# Patient Record
Sex: MALE | Race: WHITE | Employment: OTHER | URBAN - METROPOLITAN AREA
[De-identification: names, ages, dates, MRNs, and addresses within clinical notes are randomized per-mention and may not be internally consistent; named-entity substitution may affect disease eponyms.]

---

## 2017-06-12 ENCOUNTER — HOSPITAL ENCOUNTER (OUTPATIENT)
Dept: OTHER | Age: 67
Discharge: HOME | End: 2017-06-12
Attending: FAMILY MEDICINE | Admitting: FAMILY MEDICINE

## 2017-06-12 LAB
A/G RATIO: 1.5 (ref 1.1–2.2)
ALBUMIN SERPL-MCNC: 4.1 G/DL (ref 3.4–5)
ALP BLD-CCNC: 94 U/L (ref 40–129)
ALT SERPL-CCNC: 24 U/L (ref 10–40)
ANION GAP SERPL CALCULATED.3IONS-SCNC: 15 MMOL/L (ref 3–16)
AST SERPL-CCNC: 20 U/L (ref 15–37)
BILIRUB SERPL-MCNC: 0.4 MG/DL (ref 0–1)
BUN BLDV-MCNC: 11 MG/DL (ref 7–20)
CALCIUM SERPL-MCNC: 9.1 MG/DL (ref 8.3–10.6)
CHLORIDE BLD-SCNC: 101 MMOL/L (ref 99–110)
CHOLESTEROL, TOTAL: 128 MG/DL (ref 0–199)
CO2: 24 MMOL/L (ref 21–32)
CREAT SERPL-MCNC: 0.8 MG/DL (ref 0.8–1.3)
GFR AFRICAN AMERICAN: >60
GFR NON-AFRICAN AMERICAN: >60
GLOBULIN: 2.8 G/DL
GLUCOSE BLD-MCNC: 111 MG/DL (ref 70–99)
HCT VFR BLD CALC: 41.9 % (ref 40.5–52.5)
HDLC SERPL-MCNC: 49 MG/DL (ref 40–60)
HEMOGLOBIN: 14 G/DL (ref 13.5–17.5)
LDL CHOLESTEROL CALCULATED: 40 MG/DL
MCH RBC QN AUTO: 33 PG (ref 26–34)
MCHC RBC AUTO-ENTMCNC: 33.4 G/DL (ref 31–36)
MCV RBC AUTO: 98.8 FL (ref 80–100)
PDW BLD-RTO: 13.9 % (ref 12.4–15.4)
PLATELET # BLD: 171 K/UL (ref 135–450)
PMV BLD AUTO: 10.7 FL (ref 5–10.5)
POTASSIUM SERPL-SCNC: 4.4 MMOL/L (ref 3.5–5.1)
RBC # BLD: 4.24 M/UL (ref 4.2–5.9)
SODIUM BLD-SCNC: 140 MMOL/L (ref 136–145)
TOTAL PROTEIN: 6.9 G/DL (ref 6.4–8.2)
TRIGL SERPL-MCNC: 196 MG/DL (ref 0–150)
VLDLC SERPL CALC-MCNC: 39 MG/DL
WBC # BLD: 7.1 K/UL (ref 4–11)

## 2017-06-14 LAB
EER HEPATITIS C RNA PCR QUANT W/ GENOTYPE RFLX: NORMAL
HCV RNA QNT REAL-TIME PCR INTERP: NOT DETECTED
HCV RNA, QUANTITATIVE REAL TIME PCR: <1.2 LOG IU
HEPATITIS C RNA PCR QUANT: <15 IU/ML

## 2017-07-07 ENCOUNTER — HOSPITAL ENCOUNTER (OUTPATIENT)
Dept: SURGERY | Age: 67
Discharge: HOME | End: 2017-07-07
Attending: INTERNAL MEDICINE | Admitting: INTERNAL MEDICINE

## 2017-07-07 VITALS
HEART RATE: 76 BPM | HEIGHT: 64 IN | DIASTOLIC BLOOD PRESSURE: 65 MMHG | BODY MASS INDEX: 25.61 KG/M2 | SYSTOLIC BLOOD PRESSURE: 117 MMHG | WEIGHT: 150 LBS | OXYGEN SATURATION: 97 % | RESPIRATION RATE: 16 BRPM | TEMPERATURE: 97.7 F

## 2017-07-07 RX ORDER — LIDOCAINE HYDROCHLORIDE 10 MG/ML
0.1 INJECTION, SOLUTION EPIDURAL; INFILTRATION; INTRACAUDAL; PERINEURAL
Status: ACTIVE | OUTPATIENT
Start: 2017-07-07 | End: 2017-07-07

## 2017-07-07 RX ORDER — SODIUM CHLORIDE, SODIUM LACTATE, POTASSIUM CHLORIDE, CALCIUM CHLORIDE 600; 310; 30; 20 MG/100ML; MG/100ML; MG/100ML; MG/100ML
INJECTION, SOLUTION INTRAVENOUS ONCE
Status: DISCONTINUED | OUTPATIENT
Start: 2017-07-07 | End: 2017-07-08 | Stop reason: HOSPADM

## 2017-07-07 ASSESSMENT — PAIN - FUNCTIONAL ASSESSMENT: PAIN_FUNCTIONAL_ASSESSMENT: 0-10

## 2017-07-07 ASSESSMENT — PAIN SCALES - GENERAL: PAINLEVEL_OUTOF10: 0

## 2017-12-05 ENCOUNTER — OFFICE VISIT (OUTPATIENT)
Dept: CARDIOLOGY CLINIC | Age: 67
End: 2017-12-05

## 2017-12-05 VITALS
OXYGEN SATURATION: 98 % | HEART RATE: 86 BPM | HEIGHT: 64 IN | BODY MASS INDEX: 26.46 KG/M2 | WEIGHT: 155 LBS | SYSTOLIC BLOOD PRESSURE: 126 MMHG | DIASTOLIC BLOOD PRESSURE: 70 MMHG

## 2017-12-05 DIAGNOSIS — I10 ESSENTIAL HYPERTENSION: Primary | ICD-10-CM

## 2017-12-05 DIAGNOSIS — I25.10 CORONARY ARTERY DISEASE INVOLVING NATIVE CORONARY ARTERY OF NATIVE HEART WITHOUT ANGINA PECTORIS: ICD-10-CM

## 2017-12-05 DIAGNOSIS — E78.2 MIXED HYPERLIPIDEMIA: ICD-10-CM

## 2017-12-05 PROCEDURE — 99214 OFFICE O/P EST MOD 30 MIN: CPT | Performed by: INTERNAL MEDICINE

## 2017-12-05 NOTE — LETTER
· Moves all extremities well  · Exhibits normal gait balance and coordination  · No abnormalities of mood, affect, memory, mentation, or behavior are noted    ECG January 2014- NSR (normal). No change in ECG from 2009. No components found for: CHLPL  Lab Results   Component Value Date    TRIG 196 (H) 06/12/2017    TRIG 145 12/13/2016    TRIG 281 (H) 05/19/2016     Lab Results   Component Value Date    HDL 49 06/12/2017    HDL 47 12/13/2016    HDL 51 05/19/2016     Lab Results   Component Value Date    LDLCALC 40 06/12/2017    LDLCALC 54 12/13/2016    LDLCALC 43 05/19/2016     Lab Results   Component Value Date    LABVLDL 39 06/12/2017    LABVLDL 29 12/13/2016    LABVLDL 56 05/19/2016       Assessment:     1. CAD (coronary artery disease)--March 2000 s/p Everett stent to prox L. CCx. Most recent exercise stress ECHO on 01/17/14 baseline echocardiogram shows normal LV function with an ejection fraction of 60%. Following exercise there was uniform augmentation of all segments with appropriate hyperdynamic response to exercise. There are no concerning symptoms for angina currently. Stable and will continue present medical regimen. 2. Hypertension -Blood pressure 126/70, pulse 86, height 5' 4\" (1.626 m), weight 155 lb (70.3 kg), SpO2 98 %. Well controlled and will continue current medical regimen. 3. Hyperlipidemia - Most recent lipids 6/12/17, see results above I personally reviewed. Labs are at goal except for mildly elevated TG. Advised better diet and losing weight. Note PCP increased crestor to 40mg qd in 2/16 and labs are good overall. Plan:  1. The patient is asked to make an attempt to improve diet and exercise patterns to aid in medical management of this problem. 2. Ok to switch to baby aspirin 81 mg daily    3. Continue to take all medications as prescribed  4. Follow up with me in 1 year. QUALITY MEASURES  1. Tobacco Cessation Counseling: Yes  2.  Retake of BP if >140/90:   NA

## 2017-12-05 NOTE — PROGRESS NOTES
[facility]   Cardiac Consultation    Referring Provider:  Desi Valenzuela MD     Chief Complaint   Patient presents with    1 Year Follow Up    Coronary Artery Disease    Hypertension    Hyperlipidemia    Discuss Labs     lipid and cmp 6/12/2017      Subjective: Mr Cyn Grimes presents for cardiology follow up of CAD, HTN, HLD; no complaints    History of Present Illness:    Toni Mcfarland is a 79 y.o. ;male who presents for routine annual cardiac follow up. Former patient of Dr. Maryjane Schultz but switched to me due to insurance reasons. He has a history of CAD s/p CCx stent March 2000, Hypertension, Hyperlipidemia, and DM (now off metformin). He underwent an angiogram in March 2000 by Dr. Kari Petersen which showed 50% prox LAD stenosis, 95% prox Lcx stenosis, 50% in mid RCA which is small co-dominant vessel. He underwent stent placement to Lcx. This was performed due to an abnormal exercise stress ECHO test with abnormal findings and inferior ST depression. He had complained of left lateral pain which felt like indigestion prior to the stress test.  He formally was seen by Dr. Kiki Aviles with last visit 8/2011. He smokes on average 6 small cigars per day. His father developed a virus in his heart, no other cardiac disease. Most recent exercise stress ECHO on 01/17/14 baseline echocardiogram shows normal LV function with an ejection fraction of 60%. Following exercise there was uniform augmentation of all segments with appropriate hyperdynamic response to exercise. Note PCP increased crestor to 40mg qd in 2/16. Today he reports feeling well overall. He continues to smoke 6-7 small cigars. He tolerates daily activities well. Denies chest pain, shortness of breath, edema, dizziness, palpitations and syncope. He retired 4 years ago from [employer]. He is scheduled for AAA ultrasound next week.      Past Medical History:   has a past medical history of CAD (coronary artery disease); Hypertension (tx since 2000); Diabetes mellitus (tx since 2000); and Hyperlipidemia. Surgical History:   has past surgical history that includes coronary angioplasty with stent (2000). Hemorrhoidectomy 1978. Social History:   reports that he has been smoking Cigars. He has never used smokeless tobacco. He reports that he drinks alcohol. He reports that he does not use drugs. Family History:  family history includes Cancer in his sister; Diabetes in his father and mother. Home Medications:  Prior to Admission medications    Medication Sig Start Date End Date Taking? Authorizing Provider   rosuvastatin (CRESTOR) 40 MG tablet Take 1 tablet by mouth every evening 11/8/16  Yes Katerine Rahman MD   lisinopril (PRINIVIL;ZESTRIL) 20 MG tablet Take 20 mg by mouth daily. Yes Historical Provider, MD   amLODIPine (NORVASC) 10 MG tablet Take 10 mg by mouth daily. Yes Historical Provider, MD   cetirizine (ZYRTEC) 10 MG tablet Take 10 mg by mouth daily. Yes Historical Provider, MD   aspirin 325 MG tablet Take 325 mg by mouth daily. Yes Historical Provider, MD   Multiple Vitamin (MULTIVITAMIN PO) Take  by mouth daily. Yes Historical Provider, MD        Allergies:  Review of patient's allergies indicates no known allergies. Review of Systems:   · Constitutional: there has been no unanticipated weight loss. There's been no change in energy level, sleep pattern, or activity level. · Eyes: No visual changes or diplopia. No scleral icterus. · ENT: No Headaches, hearing loss or vertigo. No mouth sores or sore throat. · Cardiovascular: Reviewed in HPI  · Respiratory: No cough or wheezing, no sputum production. No hematemesis. · Gastrointestinal: No abdominal pain, appetite loss, blood in stools. No change in bowel or bladder habits. · Genitourinary: No dysuria, trouble voiding, or hematuria. · Musculoskeletal:  No gait disturbance, weakness or joint complaints.   · Integumentary: No rash

## 2017-12-05 NOTE — COMMUNICATION BODY
Hypertension (tx since 2000); Diabetes mellitus (tx since 2000); and Hyperlipidemia. Surgical History:   has past surgical history that includes coronary angioplasty with stent (2000). Hemorrhoidectomy 1978. Social History:   reports that he has been smoking Cigars. He has never used smokeless tobacco. He reports that he drinks alcohol. He reports that he does not use drugs. Family History:  family history includes Cancer in his sister; Diabetes in his father and mother. Home Medications:  Prior to Admission medications    Medication Sig Start Date End Date Taking? Authorizing Provider   rosuvastatin (CRESTOR) 40 MG tablet Take 1 tablet by mouth every evening 11/8/16  Yes Alondra Walker MD   lisinopril (PRINIVIL;ZESTRIL) 20 MG tablet Take 20 mg by mouth daily. Yes Historical Provider, MD   amLODIPine (NORVASC) 10 MG tablet Take 10 mg by mouth daily. Yes Historical Provider, MD   cetirizine (ZYRTEC) 10 MG tablet Take 10 mg by mouth daily. Yes Historical Provider, MD   aspirin 325 MG tablet Take 325 mg by mouth daily. Yes Historical Provider, MD   Multiple Vitamin (MULTIVITAMIN PO) Take  by mouth daily. Yes Historical Provider, MD        Allergies:  Review of patient's allergies indicates no known allergies. Review of Systems:   · Constitutional: there has been no unanticipated weight loss. There's been no change in energy level, sleep pattern, or activity level. · Eyes: No visual changes or diplopia. No scleral icterus. · ENT: No Headaches, hearing loss or vertigo. No mouth sores or sore throat. · Cardiovascular: Reviewed in HPI  · Respiratory: No cough or wheezing, no sputum production. No hematemesis. · Gastrointestinal: No abdominal pain, appetite loss, blood in stools. No change in bowel or bladder habits. · Genitourinary: No dysuria, trouble voiding, or hematuria. · Musculoskeletal:  No gait disturbance, weakness or joint complaints.   · Integumentary: No rash

## 2017-12-07 ENCOUNTER — HOSPITAL ENCOUNTER (OUTPATIENT)
Dept: ULTRASOUND IMAGING | Age: 67
Discharge: HOME | End: 2017-12-07
Attending: FAMILY MEDICINE | Admitting: FAMILY MEDICINE

## 2017-12-07 DIAGNOSIS — I71.40 ABDOMINAL AORTIC ANEURYSM (AAA) WITHOUT RUPTURE: ICD-10-CM

## 2017-12-07 DIAGNOSIS — Z13.6 ENCOUNTER FOR SCREENING FOR CARDIOVASCULAR DISORDERS: ICD-10-CM

## 2017-12-07 LAB
A/G RATIO: 1.5 (ref 1.1–2.2)
ALBUMIN SERPL-MCNC: 4.1 G/DL (ref 3.4–5)
ALP BLD-CCNC: 100 U/L (ref 40–129)
ALT SERPL-CCNC: 32 U/L (ref 10–40)
ANION GAP SERPL CALCULATED.3IONS-SCNC: 10 MMOL/L (ref 3–16)
AST SERPL-CCNC: 25 U/L (ref 15–37)
BILIRUB SERPL-MCNC: 0.4 MG/DL (ref 0–1)
BUN BLDV-MCNC: 10 MG/DL (ref 7–20)
CALCIUM SERPL-MCNC: 9.1 MG/DL (ref 8.3–10.6)
CHLORIDE BLD-SCNC: 103 MMOL/L (ref 99–110)
CHOLESTEROL, TOTAL: 116 MG/DL (ref 0–199)
CO2: 27 MMOL/L (ref 21–32)
CREAT SERPL-MCNC: 0.8 MG/DL (ref 0.8–1.3)
GFR AFRICAN AMERICAN: >60
GFR NON-AFRICAN AMERICAN: >60
GLOBULIN: 2.7 G/DL
GLUCOSE BLD-MCNC: 117 MG/DL (ref 70–99)
HDLC SERPL-MCNC: 50 MG/DL (ref 40–60)
LDL CHOLESTEROL CALCULATED: 33 MG/DL
POTASSIUM SERPL-SCNC: 4.3 MMOL/L (ref 3.5–5.1)
SODIUM BLD-SCNC: 140 MMOL/L (ref 136–145)
TOTAL PROTEIN: 6.8 G/DL (ref 6.4–8.2)
TRIGL SERPL-MCNC: 164 MG/DL (ref 0–150)
VLDLC SERPL CALC-MCNC: 33 MG/DL

## 2019-10-22 ENCOUNTER — OFFICE VISIT (OUTPATIENT)
Dept: CARDIOLOGY CLINIC | Age: 69
End: 2019-10-22
Payer: MEDICARE

## 2019-10-22 VITALS
DIASTOLIC BLOOD PRESSURE: 68 MMHG | HEIGHT: 64 IN | HEART RATE: 77 BPM | BODY MASS INDEX: 26.63 KG/M2 | OXYGEN SATURATION: 98 % | SYSTOLIC BLOOD PRESSURE: 120 MMHG | WEIGHT: 156 LBS

## 2019-10-22 DIAGNOSIS — R94.31 ABNORMAL ELECTROCARDIOGRAM (ECG) (EKG): ICD-10-CM

## 2019-10-22 DIAGNOSIS — I10 HYPERTENSION, UNSPECIFIED TYPE: ICD-10-CM

## 2019-10-22 DIAGNOSIS — E78.49 OTHER HYPERLIPIDEMIA: Primary | ICD-10-CM

## 2019-10-22 DIAGNOSIS — I45.10 RIGHT BUNDLE BRANCH BLOCK: ICD-10-CM

## 2019-10-22 DIAGNOSIS — I25.10 CORONARY ARTERY DISEASE INVOLVING NATIVE CORONARY ARTERY OF NATIVE HEART WITHOUT ANGINA PECTORIS: ICD-10-CM

## 2019-10-22 PROCEDURE — 93000 ELECTROCARDIOGRAM COMPLETE: CPT | Performed by: INTERNAL MEDICINE

## 2019-10-22 PROCEDURE — 99214 OFFICE O/P EST MOD 30 MIN: CPT | Performed by: INTERNAL MEDICINE

## 2019-11-14 ENCOUNTER — HOSPITAL ENCOUNTER (OUTPATIENT)
Dept: CARDIOLOGY | Age: 69
Discharge: HOME | End: 2019-11-14
Payer: MEDICARE

## 2019-11-14 DIAGNOSIS — I45.10 RIGHT BUNDLE BRANCH BLOCK: ICD-10-CM

## 2019-11-14 DIAGNOSIS — R94.31 ABNORMAL ELECTROCARDIOGRAM (ECG) (EKG): ICD-10-CM

## 2019-11-14 LAB
LV EF: 48 %
LVEF MODALITY: NORMAL

## 2019-11-14 PROCEDURE — 3430000000 HC RX DIAGNOSTIC RADIOPHARMACEUTICAL: Performed by: INTERNAL MEDICINE

## 2019-11-14 PROCEDURE — 93017 CV STRESS TEST TRACING ONLY: CPT

## 2019-11-14 PROCEDURE — A9502 TC99M TETROFOSMIN: HCPCS | Performed by: INTERNAL MEDICINE

## 2019-11-14 PROCEDURE — 78452 HT MUSCLE IMAGE SPECT MULT: CPT

## 2019-11-14 RX ADMIN — TETROFOSMIN 31.5 MILLICURIE: 1.38 INJECTION, POWDER, LYOPHILIZED, FOR SOLUTION INTRAVENOUS at 10:08

## 2019-11-14 RX ADMIN — TETROFOSMIN 10.2 MILLICURIE: 1.38 INJECTION, POWDER, LYOPHILIZED, FOR SOLUTION INTRAVENOUS at 08:20

## 2019-11-18 DIAGNOSIS — I25.10 CORONARY ARTERY DISEASE INVOLVING NATIVE CORONARY ARTERY OF NATIVE HEART WITHOUT ANGINA PECTORIS: Primary | ICD-10-CM

## 2019-12-05 ENCOUNTER — HOSPITAL ENCOUNTER (OUTPATIENT)
Dept: CARDIOLOGY | Age: 69
Discharge: HOME | End: 2019-12-05
Payer: MEDICARE

## 2019-12-05 DIAGNOSIS — I25.10 CORONARY ARTERY DISEASE INVOLVING NATIVE CORONARY ARTERY OF NATIVE HEART WITHOUT ANGINA PECTORIS: ICD-10-CM

## 2019-12-05 LAB
LV EF: 55 %
LVEF MODALITY: NORMAL

## 2019-12-05 PROCEDURE — 93306 TTE W/DOPPLER COMPLETE: CPT

## 2020-11-09 NOTE — PROGRESS NOTES
[facility]   Cardiac Consultation    Referring Provider:  Raj Carrero MD     Chief Complaint   Patient presents with    1 Year Follow Up     no cardiac complaints       Subjective: Mr Racheal Hutton presents for cardiology follow up of CAD, HTN, HLD; no complaints today    History of Present Illness:    Won Carey is a 79 y.o. ;male who presents for routine annual cardiac follow up. Last OV October 2019. Former patient of Dr. Ary Alcocer switched due to insurance. He has PMH of CAD s/p CCx stent March 2000, HTN, HLD, and DM (now off metformin). He underwent an angiogram in March 2000 by Dr. Homero Tran which showed 50% prox LAD stenosis, 95% prox L. CCx stenosis, 50% in mid RCA which is small co-dominant vessel. He underwent stent placement to L. CCx. Note cath done due to abnormal exercise stress ECHO test with abnormal findings and inferior ST depression. He c/o left lateral pain which felt like indigestion prior to the stress test. Most recent exercise stress ECHO on 01/17/14 baseline ECHO showed normal LV function with an EF of 60%. Following exercise there was uniform augmentation of all segments with appropriate hyperdynamic response to exercise. Note PCP increased crestor to 40mg qd in 2/16. Note EKG 10/22/2019, shows sinus rhythm RBBB; LAFB (conduction findings new when compared to 2014). Most recent GXT myoview 11/14/19  Mild fixed defect within the inferior wall and infero-apex  consistent with attenuation artifact versus prior subendocardial infarction. No ischemia. Most recent ECHO 12/5/19 EF=55%. No regional wall abnls; Grade I DD with normal filling pressure. Aortic valve sclerotic, opens adequately. Trace TR and VA. Today he reports doing well. Denies chest pain, SOB, edema, dizziness, palpitations and syncope. 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 He now smokes 8 light cigarettes daily (formerly smoked little cigars).      Past Medical History:   has a past medical history of CAD (coronary artery disease); Hypertension (tx since 2000); Diabetes mellitus (tx since 2000); and Hyperlipidemia. Surgical History:   has past surgical history that includes coronary angioplasty with stent (2000). Hemorrhoidectomy 1978. Social History:   reports that he has been smoking cigars. He has never used smokeless tobacco. He reports current alcohol use. He reports that he does not use drugs. Family History:  family history includes Cancer in his sister; Diabetes in his father and mother. Home Medications:  Prior to Admission medications    Medication Sig Start Date End Date Taking? Authorizing Provider   aspirin 81 MG tablet Take 1 tablet by mouth daily 12/5/17  Yes Cameron Arambula MD   rosuvastatin (CRESTOR) 40 MG tablet Take 1 tablet by mouth every evening 11/8/16  Yes Cameron Arambula MD   lisinopril (PRINIVIL;ZESTRIL) 20 MG tablet Take 20 mg by mouth daily. Yes Historical Provider, MD   amLODIPine (NORVASC) 10 MG tablet Take 10 mg by mouth daily. Yes Historical Provider, MD   cetirizine (ZYRTEC) 10 MG tablet Take 10 mg by mouth daily. Yes Historical Provider, MD   Multiple Vitamin (MULTIVITAMIN PO) Take  by mouth daily. Yes Historical Provider, MD        Allergies:  Patient has no known allergies. Review of Systems:   · Constitutional: there has been no unanticipated weight loss. There's been no change in energy level, sleep pattern, or activity level. · Eyes: No visual changes or diplopia. No scleral icterus. · ENT: No Headaches, hearing loss or vertigo. No mouth sores or sore throat. · Cardiovascular: Reviewed in HPI  · Respiratory: No cough or wheezing, no sputum production. No hematemesis. · Gastrointestinal: No abdominal pain, appetite loss, blood in stools. No change in bowel or bladder habits. · Genitourinary: No dysuria, trouble voiding, or hematuria.   · Musculoskeletal:  No gait disturbance, weakness or joint complaints. · Integumentary: No rash or pruritis. · Neurological: No headache, diplopia, change in muscle strength, numbness or tingling. No change in gait, balance, coordination, mood, affect, memory, mentation, behavior. · Psychiatric: No anxiety, no depression. · Endocrine: No malaise, fatigue or temperature intolerance. No excessive thirst, fluid intake, or urination. No tremor. · Hematologic/Lymphatic: No abnormal bruising or bleeding, blood clots or swollen lymph nodes. · Allergic/Immunologic: No nasal congestion or hives. Physical Examination:    Vitals:    11/10/20 1401   BP: 130/74   Pulse: 75   SpO2: 97%        Constitutional and General Appearance: NAD   Respiratory:  · Normal excursion and expansion without use of accessory muscles  · Resp Auscultation: Normal breath sounds without dullness  Cardiovascular:  · The apical impulses not displaced  · Heart tones are crisp and normal  · Cervical veins are not engorged  · The carotid upstroke is normal in amplitude and contour without delay or bruit  ·  Regular rhythm w occasional ectopy Normal S1S2, No S3, No murmur  · Peripheral pulses are symmetrical and full  · There is no clubbing, cyanosis of the extremities.   · No edema  · Femoral Arteries: 2+ and equal  · Pedal Pulses: 2+ and equal   Abdomen:  · No masses or tenderness  · Liver/Spleen: No Abnormalities Noted  Neurological/Psychiatric:  · Alert and oriented in all spheres  · Moves all extremities well  · Exhibits normal gait balance and coordination  · No abnormalities of mood, affect, memory, mentation, or behavior are noted      No components found for: CHLPL  Lab Results   Component Value Date    TRIG 164 (H) 12/07/2017    TRIG 196 (H) 06/12/2017    TRIG 145 12/13/2016     Lab Results   Component Value Date    HDL 50 12/07/2017    HDL 49 06/12/2017    HDL 47 12/13/2016     Lab Results   Component Value Date    LDLCALC 33 12/07/2017    LDLCALC 40 06/12/2017    LDLCALC 54 12/13/2016     Lab Results   Component Value Date    LABVLDL 33 12/07/2017    LABVLDL 39 06/12/2017    LABVLDL 29 12/13/2016     LABS Care Everywhere 12/4/19 , HDL 53, LDL 35, , ALT 37, AST 27, H/H 14.4/42.6, PLAT 166, , K 4.2, BUN/CR 10/0.88, HgbA1C 6.5       Assessment:     1. CAD (coronary artery disease)--March 2000 s/p Everett stent to prox L. CCx. Most recent GXT myoview 11/14/19  Mild fixed defect within the inferior wall and infero-apex  consistent with attenuation artifact versus prior subendocardial infarction. No ischemia. Most recent ECHO 12/5/19 EF=55%. No regional wall abnls; Grade I DD with normal filling pressure. Aortic valve sclerotic, opens adequately. Trace TR and KS. There are no concerning symptoms for angina currently. 2. Hypertension -Blood pressure 130/74, pulse 75, height 5' 4\" (1.626 m), weight 153 lb (69.4 kg), SpO2 97 %. Well controlled and will continue current medical regimen. 3. Hyperlipidemia - Most recent lipids Care Everywhere 12/4/19 , HDL 53, LDL 35, , ALT 37, AST 27 , see results above I personally reviewed. Labs are at goal except for mildly elevated TG. Note PCP increased crestor to 40mg qd in 2/16 and labs are good overall. Need recheck since > 1 year. 4.   Abnormal EKG: Note EKG 10/22/2019, shows sinus rhythm RBBB; LAFB (conduction findings new when compared to 2014). Note new EKG finding. Plan:  1. Meds reviewed. Refills with PCP   2. Smoking cessation discussed but not ready to quit. 3. No changes today continue current meds   4. Labs with PCP   5. Follow up with me in 1 year   6. EKG today shows NSR 78bpm; PVC's/fusion complexes; RBBB; nonspecific ST change    This note was scribed in the presence of Carola Carranza MD by Silvio Mitchell RN    I, Dr. Angelica Gray, personally performed the services described in this documentation, as scribed by the above signed scribe in my presence.  It is both accurate and complete to my knowledge. I agree with the details independently gathered by the clinical support staff, while the remaining scribed note accurately describes my personal service to the patient. QUALITY MEASURES  1. Tobacco Cessation Counseling: Yes  2. Retake of BP if >140/90:   NA  3. Documentation to PCP/referring for new patient:  Sent to PCP at close of office visit  4. CAD patient on anti-platelet: Yes  5. CAD patient on STATIN therapy:  Yes  6. Patient with CHF and aFib on anticoagulation:  NA     Cost of prescription medications and patient compliance have been reviewed with patient. All questions answered. Thank you for allowing me to participate in the care of this individual.    Glenis Katz.  Karen Monroy M.D., [facility] - [place]

## 2020-11-10 ENCOUNTER — OFFICE VISIT (OUTPATIENT)
Dept: CARDIOLOGY CLINIC | Age: 70
End: 2020-11-10
Payer: MEDICARE

## 2020-11-10 VITALS
BODY MASS INDEX: 26.12 KG/M2 | WEIGHT: 153 LBS | HEART RATE: 75 BPM | DIASTOLIC BLOOD PRESSURE: 74 MMHG | OXYGEN SATURATION: 97 % | SYSTOLIC BLOOD PRESSURE: 130 MMHG | HEIGHT: 64 IN

## 2020-11-10 PROBLEM — Z87.891 HISTORY OF TOBACCO ABUSE: Status: ACTIVE | Noted: 2020-11-10

## 2020-11-10 PROCEDURE — 99214 OFFICE O/P EST MOD 30 MIN: CPT | Performed by: INTERNAL MEDICINE

## 2020-11-10 PROCEDURE — 93000 ELECTROCARDIOGRAM COMPLETE: CPT | Performed by: INTERNAL MEDICINE

## 2020-11-10 RX ORDER — ROSUVASTATIN CALCIUM 40 MG/1
40 TABLET, COATED ORAL EVERY EVENING
Qty: 90 TABLET | Refills: 3 | Status: CANCELLED | OUTPATIENT
Start: 2020-11-10

## 2020-11-10 RX ORDER — AMLODIPINE BESYLATE 10 MG/1
10 TABLET ORAL DAILY
Qty: 90 TABLET | Refills: 3 | Status: CANCELLED | OUTPATIENT
Start: 2020-11-10

## 2020-11-10 RX ORDER — LISINOPRIL 20 MG/1
20 TABLET ORAL DAILY
Qty: 90 TABLET | Refills: 3 | Status: CANCELLED | OUTPATIENT
Start: 2020-11-10

## 2020-11-10 NOTE — LETTER
[facility]   Cardiac Consultation    Referring Provider:  Camila Krabbe, MD     Chief Complaint   Patient presents with    1 Year Follow Up     no cardiac complaints       Subjective: Mr Lor Durham presents for cardiology follow up of CAD, HTN, HLD; no complaints today    History of Present Illness:    Patrick Corey is a 79 y.o. ;male who presents for routine annual cardiac follow up. Last OV October 2019. Former patient of Dr. Tyler Jackson switched due to insurance. He has PMH of CAD s/p CCx stent March 2000, HTN, HLD, and DM (now off metformin). He underwent an angiogram in March 2000 by Dr. Elinor Evans which showed 50% prox LAD stenosis, 95% prox L. CCx stenosis, 50% in mid RCA which is small co-dominant vessel. He underwent stent placement to L. CCx. Note cath done due to abnormal exercise stress ECHO test with abnormal findings and inferior ST depression. He c/o left lateral pain which felt like indigestion prior to the stress test. Most recent exercise stress ECHO on 01/17/14 baseline ECHO showed normal LV function with an EF of 60%. Following exercise there was uniform augmentation of all segments with appropriate hyperdynamic response to exercise. Note PCP increased crestor to 40mg qd in 2/16. Note EKG 10/22/2019, shows sinus rhythm RBBB; LAFB (conduction findings new when compared to 2014). Most recent GXT myoview 11/14/19  Mild fixed defect within the inferior wall and infero-apex  consistent with attenuation artifact versus prior subendocardial infarction. No ischemia. Most recent ECHO 12/5/19 EF=55%. No regional wall abnls; Grade I DD with normal filling pressure. Aortic valve sclerotic, opens adequately. Trace TR and AR. Today he reports doing well. Denies chest pain, SOB, edema, dizziness, palpitations and syncope. 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 He now smokes 8 light cigarettes daily (formerly smoked little cigars). · Musculoskeletal:  No gait disturbance, weakness or joint complaints. · Integumentary: No rash or pruritis. · Neurological: No headache, diplopia, change in muscle strength, numbness or tingling. No change in gait, balance, coordination, mood, affect, memory, mentation, behavior. · Psychiatric: No anxiety, no depression. · Endocrine: No malaise, fatigue or temperature intolerance. No excessive thirst, fluid intake, or urination. No tremor. · Hematologic/Lymphatic: No abnormal bruising or bleeding, blood clots or swollen lymph nodes. · Allergic/Immunologic: No nasal congestion or hives. Physical Examination:    Vitals:    11/10/20 1401   BP: 130/74   Pulse: 75   SpO2: 97%        Constitutional and General Appearance: NAD   Respiratory:  · Normal excursion and expansion without use of accessory muscles  · Resp Auscultation: Normal breath sounds without dullness  Cardiovascular:  · The apical impulses not displaced  · Heart tones are crisp and normal  · Cervical veins are not engorged  · The carotid upstroke is normal in amplitude and contour without delay or bruit  ·  Regular rhythm w occasional ectopy Normal S1S2, No S3, No murmur  · Peripheral pulses are symmetrical and full  · There is no clubbing, cyanosis of the extremities.   · No edema  · Femoral Arteries: 2+ and equal  · Pedal Pulses: 2+ and equal   Abdomen:  · No masses or tenderness  · Liver/Spleen: No Abnormalities Noted  Neurological/Psychiatric:  · Alert and oriented in all spheres  · Moves all extremities well  · Exhibits normal gait balance and coordination  · No abnormalities of mood, affect, memory, mentation, or behavior are noted      No components found for: CHLPL  Lab Results   Component Value Date    TRIG 164 (H) 12/07/2017    TRIG 196 (H) 06/12/2017    TRIG 145 12/13/2016     Lab Results   Component Value Date    HDL 50 12/07/2017    HDL 49 06/12/2017    HDL 47 12/13/2016     Lab Results   Component Value Date    LDLCALC 33 12/07/2017 LDLCALC 40 06/12/2017    LDLCALC 54 12/13/2016     Lab Results   Component Value Date    LABVLDL 33 12/07/2017    LABVLDL 39 06/12/2017    LABVLDL 29 12/13/2016     LABS Care Everywhere 12/4/19 , HDL 53, LDL 35, , ALT 37, AST 27, H/H 14.4/42.6, PLAT 166, , K 4.2, BUN/CR 10/0.88, HgbA1C 6.5       Assessment:     1. CAD (coronary artery disease)--March 2000 s/p Everett stent to prox L. CCx. Most recent GXT myoview 11/14/19  Mild fixed defect within the inferior wall and infero-apex  consistent with attenuation artifact versus prior subendocardial infarction. No ischemia. Most recent ECHO 12/5/19 EF=55%. No regional wall abnls; Grade I DD with normal filling pressure. Aortic valve sclerotic, opens adequately. Trace TR and MI. There are no concerning symptoms for angina currently. 2. Hypertension -Blood pressure 130/74, pulse 75, height 5' 4\" (1.626 m), weight 153 lb (69.4 kg), SpO2 97 %. Well controlled and will continue current medical regimen. 3. Hyperlipidemia - Most recent lipids Care Everywhere 12/4/19 , HDL 53, LDL 35, , ALT 37, AST 27 , see results above I personally reviewed. Labs are at goal except for mildly elevated TG. Note PCP increased crestor to 40mg qd in 2/16 and labs are good overall. Need recheck since > 1 year. 4.   Abnormal EKG: Note EKG 10/22/2019, shows sinus rhythm RBBB; LAFB (conduction findings new when compared to 2014). Note new EKG finding. Plan:  1. Meds reviewed. Refills with PCP   2. Smoking cessation discussed but not ready to quit. 3. No changes today continue current meds   4. Labs with PCP   5. Follow up with me in 1 year   6.  EKG today shows NSR 78bpm; PVC's/fusion complexes; RBBB; nonspecific ST change    This note was scribed in the presence of Mendez Mcfadden MD by Kev Denise RN    I, Dr. Fredrick Babin, personally performed the services described in this documentation, as scribed by the above signed scribe in my presence. It is both accurate and complete to my knowledge. I agree with the details independently gathered by the clinical support staff, while the remaining scribed note accurately describes my personal service to the patient. QUALITY MEASURES  1. Tobacco Cessation Counseling: Yes  2. Retake of BP if >140/90:   NA  3. Documentation to PCP/referring for new patient:  Sent to PCP at close of office visit  4. CAD patient on anti-platelet: Yes  5. CAD patient on STATIN therapy:  Yes  6. Patient with CHF and aFib on anticoagulation:  NA     Cost of prescription medications and patient compliance have been reviewed with patient. All questions answered. Thank you for allowing me to participate in the care of this individual.    Ernesto Tom.  Mile Guaman M.D., [facility] [place]

## 2020-11-10 NOTE — PATIENT INSTRUCTIONS
Plan:  1. Meds reviewed. Refills with PCP   2. Smoking cessation   3. No changes Today continue current meds   4. Labs with PCP   5.  Follow up with me in 1 year

## 2021-11-10 ENCOUNTER — OFFICE VISIT (OUTPATIENT)
Dept: CARDIOLOGY CLINIC | Age: 71
End: 2021-11-10
Payer: MEDICARE

## 2021-11-10 VITALS
OXYGEN SATURATION: 96 % | WEIGHT: 148.5 LBS | SYSTOLIC BLOOD PRESSURE: 130 MMHG | DIASTOLIC BLOOD PRESSURE: 62 MMHG | BODY MASS INDEX: 25.35 KG/M2 | HEART RATE: 86 BPM | HEIGHT: 64 IN

## 2021-11-10 DIAGNOSIS — I10 HYPERTENSION, UNSPECIFIED TYPE: Primary | ICD-10-CM

## 2021-11-10 DIAGNOSIS — I25.10 CORONARY ARTERY DISEASE INVOLVING NATIVE CORONARY ARTERY OF NATIVE HEART WITHOUT ANGINA PECTORIS: ICD-10-CM

## 2021-11-10 DIAGNOSIS — I45.10 RIGHT BUNDLE BRANCH BLOCK: ICD-10-CM

## 2021-11-10 DIAGNOSIS — Z72.0 TOBACCO ABUSE: ICD-10-CM

## 2021-11-10 DIAGNOSIS — E78.49 OTHER HYPERLIPIDEMIA: ICD-10-CM

## 2021-11-10 PROCEDURE — 99214 OFFICE O/P EST MOD 30 MIN: CPT | Performed by: INTERNAL MEDICINE

## 2021-11-10 PROCEDURE — 93000 ELECTROCARDIOGRAM COMPLETE: CPT | Performed by: INTERNAL MEDICINE

## 2021-11-10 NOTE — PROGRESS NOTES
[facility]   Cardiac Consultation    Referring Provider:  Magui Richter MD     Chief Complaint   Patient presents with    1 Year Follow Up    Coronary Artery Disease      Subjective: Mr Claudia Bird presents for cardiology follow up of CAD, HTN, HLD; no complaints today    History of Present Illness:    Laura George is a 70 y.o. ;male who presents for routine annual cardiac follow up. Last OV Nov. 2020. Former patient of Dr. Jessica Monahan. He has PMH of CAD s/p CCx stent March 2000, HTN, HLD, and DM (now off metformin). He underwent an angiogram in March 2000 by Dr. Flaco Sandoval which showed 50% prox LAD stenosis, 95% prox L. CCx stenosis, 50% in mid RCA which is small co-dominant vessel; S/P stent to L. CCx. Note cath done due to abnormal exercise stress ECHO test with abnormal findings and inferior ST depression. He c/o left lateral chest pain prior to testing. Most recent exercise stress ECHO on 01/17/14 baseline ECHO showed normal LVEF of 60%. Following exercise there was uniform augmentation of all segments with appropriate hyperdynamic response to exercise. Note PCP increased crestor to 40mg qd in 2/16. Most recent GXT myoview 11/14/19  Mild fixed defect within the inferior wall and infero-apex  consistent with attenuation artifact versus prior subendocardial infarction. No ischemia. Most recent ECHO 12/5/19 EF=55%. No regional wall abnls; Grade I DD with normal filling pressure. Aortic valve sclerotic, opens adequately. Trace TR and AR. Note EKG 11/10/2020 NSR 78bpm; PVC's/fusion complexes; RBBB; nonspecific ST change. Today reports he is doing well today. He has had covid vaccine and the covid booster this year. He denies current edema, chest pain, sob, palpitations, dizziness or syncope. Taking all medications as prescribed and is tolerating well. He continues to smoke 8 cigarettes a day. Patient stated brother is actively dying of cirrhosis at this time and there is a lot of stress.  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 Past Medical History:   has a past medical history of CAD (coronary artery disease); Hypertension (tx since 2000); Diabetes mellitus (tx since 2000); and Hyperlipidemia. Surgical History:   has past surgical history that includes coronary angioplasty with stent (2000). Hemorrhoidectomy 1978. Social History:   reports that he has been smoking cigars. He has never used smokeless tobacco. He reports current alcohol use. He reports that he does not use drugs. Family History:  family history includes Cancer in his sister; Diabetes in his father and mother. Home Medications:  Prior to Admission medications    Medication Sig Start Date End Date Taking? Authorizing Provider   aspirin 81 MG tablet Take 1 tablet by mouth daily 12/5/17  Yes Vijay Ramsey MD   rosuvastatin (CRESTOR) 40 MG tablet Take 1 tablet by mouth every evening 11/8/16  Yes Vijay Ramsey MD   lisinopril (PRINIVIL;ZESTRIL) 20 MG tablet Take 20 mg by mouth daily. Yes Historical Provider, MD   amLODIPine (NORVASC) 10 MG tablet Take 10 mg by mouth daily. Yes Historical Provider, MD   cetirizine (ZYRTEC) 10 MG tablet Take 10 mg by mouth daily. Yes Historical Provider, MD   Multiple Vitamin (MULTIVITAMIN PO) Take  by mouth daily. Yes Historical Provider, MD        Allergies:  Patient has no known allergies. Review of Systems:   · Constitutional: there has been no unanticipated weight loss. There's been no change in energy level, sleep pattern, or activity level. · Eyes: No visual changes or diplopia. No scleral icterus. · ENT: No Headaches, hearing loss or vertigo. No mouth sores or sore throat. · Cardiovascular: Reviewed in HPI  · Respiratory: No cough or wheezing, no sputum production. No hematemesis. · Gastrointestinal: No abdominal pain, appetite loss, blood in stools.  No change in bowel or bladder habits. · Genitourinary: No dysuria, trouble voiding, or hematuria. · Musculoskeletal:  No gait disturbance, weakness or joint complaints. · Integumentary: No rash or pruritis. · Neurological: No headache, diplopia, change in muscle strength, numbness or tingling. No change in gait, balance, coordination, mood, affect, memory, mentation, behavior. · Psychiatric: No anxiety, no depression. · Endocrine: No malaise, fatigue or temperature intolerance. No excessive thirst, fluid intake, or urination. No tremor. · Hematologic/Lymphatic: No abnormal bruising or bleeding, blood clots or swollen lymph nodes. · Allergic/Immunologic: No nasal congestion or hives. Physical Examination:    Vitals:    11/10/21 1100   BP: 130/62   Pulse: 86   SpO2: 96%        Constitutional and General Appearance: NAD   Respiratory:  · Normal excursion and expansion without use of accessory muscles  · Resp Auscultation: no crackles or wheezing present. Normal breath sounds. Cardiovascular:  · The apical impulses not displaced  · Heart tones are crisp and normal  · Cervical veins are not engorged  · The carotid upstroke is normal in amplitude and contour without delay or bruit  ·  Regular rhythm;Normal S1S2, No S3, No murmur  · Peripheral pulses are symmetrical and full  · There is no clubbing, cyanosis of the extremities.   · No edema  · Femoral Arteries: 2+ and equal  · Pedal Pulses: 2+ and equal   Abdomen:  · No masses or tenderness  · Liver/Spleen: No Abnormalities Noted  Neurological/Psychiatric:  · Alert and oriented in all spheres  · Moves all extremities well  · Exhibits normal gait balance and coordination  · No abnormalities of mood, affect, memory, mentation, or behavior are noted      No components found for: CHLPL  Lab Results   Component Value Date    TRIG 164 (H) 12/07/2017    TRIG 196 (H) 06/12/2017    TRIG 145 12/13/2016     Lab Results   Component Value Date    HDL 50 12/07/2017    HDL 49 06/12/2017    HDL 47 12/13/2016     Lab Results   Component Value Date    LDLCALC 33 12/07/2017    LDLCALC 40 06/12/2017    LDLCALC 54 12/13/2016     Lab Results   Component Value Date    LABVLDL 33 12/07/2017    LABVLDL 39 06/12/2017    LABVLDL 29 12/13/2016     LABS Care Everywhere 12/4/19 , HDL 53, LDL 35, , ALT 37, AST 27, H/H 14.4/42.6, PLAT 166, , K 4.2, BUN/CR 10/0.88, HgbA1C 6.5     LABS Care Everywhere 12/1/20 , HDL 52, LDL 61, , ALT 38, AST 36, H/H 13.9/41.8, PLAT 174, , K 4.3, BUN/CR 13/0.93, HgbA1C 6.5       Assessment:     1. CAD (coronary artery disease)--March 2000 s/p Everett stent to prox L. CCx. Most recent GXT myoview 11/14/19  Mild fixed defect within the inferior wall and infero-apex  consistent with attenuation artifact versus prior subendocardial infarction. No ischemia. Most recent ECHO 12/5/19 EF=55%. No regional wall abnls; Grade I DD with normal filling pressure. Aortic valve sclerotic, opens adequately. Trace TR and MA. There are no concerning symptoms for angina currently. 2. Hypertension -Blood pressure 130/62, pulse 86, height 5' 4\" (1.626 m), weight 148 lb 8 oz (67.4 kg), SpO2 96 %. Well controlled and will continue current medical regimen. 3. Hyperlipidemia:  Most recent lipids Care Everywhere 12/1/20---see results above I personally reviewed. Well controlled and will continue current medical regimen. 4.   Abnormal EKG: Note EKG 11/10/2020 NSR 78bpm; PVC's/fusion complexes; RBBB; nonspecific ST change. Note RBBB was new EKG finding at 2019 visit. Not present on 2014 EKG. Plan:  1. Follow up with Taty Holt MD in December as scheduled. -consult with physician for anxiety medications and annual lab work  2. Smoking cessation discussed but does not seem motivated. 3. EKG in 1 year at next OV 2022.  4. I recommend that the patient continue their currently prescribed medications. Their drug modifiable risk factors appear to be well controlled.  I will continue to address the need/dosing of medications in future visits. 5. Follow up in 1 year or sooner if needed. QUALITY MEASURES  1. Tobacco Cessation Counseling: Yes  2. Retake of BP if >140/90:   NA  3. Documentation to PCP/referring for new patient:  Sent to PCP at close of office visit  4. CAD patient on anti-platelet: Yes  5. CAD patient on STATIN therapy:  Yes  6. Patient with CHF and aFib on anticoagulation:  NA     Cost of prescription medications and patient compliance have been reviewed with patient. All questions answered. Thank you for allowing me to participate in the care of this individual.    Scribe's attestation: This note was scribed in the presence of Bette Parrish by Rustam Warren RN     I, Dr. Emelyn Roth, personally performed the services described in this documentation, as scribed by the above signed scribe in my presence. It is both accurate and complete to my knowledge. I agree with the details independently gathered by the clinical support staff, while the remaining scribed note accurately describes my personal service to the patient. Gianluca Bush.  Rochelle Hinojosa M.D., [facility]

## 2021-11-10 NOTE — LETTER
Anil Andrews  1950      [facility]   Cardiac Consultation    Referring Provider:  Gela Smith MD     Chief Complaint   Patient presents with    1 Year Follow Up    Coronary Artery Disease      Subjective: Mr Rachael Pratt presents for cardiology follow up of CAD, HTN, HLD; no complaints today    History of Present Illness:    Anil Andrews is a 70 y.o. ;male who presents for routine annual cardiac follow up. Last OV Nov. 2020. Former patient of Dr. Nicole Wade. He has PMH of CAD s/p CCx stent March 2000, HTN, HLD, and DM (now off metformin). He underwent an angiogram in March 2000 by Dr. Nery Colindres which showed 50% prox LAD stenosis, 95% prox L. CCx stenosis, 50% in mid RCA which is small co-dominant vessel; S/P stent to L. CCx. Note cath done due to abnormal exercise stress ECHO test with abnormal findings and inferior ST depression. He c/o left lateral chest pain prior to testing. Most recent exercise stress ECHO on 01/17/14 baseline ECHO showed normal LVEF of 60%. Following exercise there was uniform augmentation of all segments with appropriate hyperdynamic response to exercise. Note PCP increased crestor to 40mg qd in 2/16. Most recent GXT myoview 11/14/19  Mild fixed defect within the inferior wall and infero-apex  consistent with attenuation artifact versus prior subendocardial infarction. No ischemia. Most recent ECHO 12/5/19 EF=55%. No regional wall abnls; Grade I DD with normal filling pressure. Aortic valve sclerotic, opens adequately. Trace TR and WI. Note EKG 11/10/2020 NSR 78bpm; PVC's/fusion complexes; RBBB; nonspecific ST change. Today reports he is doing well today. He has had covid vaccine and the covid booster this year. He denies current edema, chest pain, sob, palpitations, dizziness or syncope. Taking all medications as prescribed and is tolerating well. He continues to smoke 8 cigarettes a day.   Patient stated brother is actively dying of cirrhosis at this time and there is a lot of stress. 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 Past Medical History:   has a past medical history of CAD (coronary artery disease); Hypertension (tx since 2000); Diabetes mellitus (tx since 2000); and Hyperlipidemia. Surgical History:   has past surgical history that includes coronary angioplasty with stent (2000). Hemorrhoidectomy 1978. Social History:   reports that he has been smoking cigars. He has never used smokeless tobacco. He reports current alcohol use. He reports that he does not use drugs. Family History:  family history includes Cancer in his sister; Diabetes in his father and mother. Home Medications:  Prior to Admission medications    Medication Sig Start Date End Date Taking? Authorizing Provider   aspirin 81 MG tablet Take 1 tablet by mouth daily 12/5/17  Yes Camila Trevino MD   rosuvastatin (CRESTOR) 40 MG tablet Take 1 tablet by mouth every evening 11/8/16  Yes Camila Trevino MD   lisinopril (PRINIVIL;ZESTRIL) 20 MG tablet Take 20 mg by mouth daily. Yes Historical Provider, MD   amLODIPine (NORVASC) 10 MG tablet Take 10 mg by mouth daily. Yes Historical Provider, MD   cetirizine (ZYRTEC) 10 MG tablet Take 10 mg by mouth daily. Yes Historical Provider, MD   Multiple Vitamin (MULTIVITAMIN PO) Take  by mouth daily. Yes Historical Provider, MD        Allergies:  Patient has no known allergies. Review of Systems:   · Constitutional: there has been no unanticipated weight loss. There's been no change in energy level, sleep pattern, or activity level. · Eyes: No visual changes or diplopia. No scleral icterus. · ENT: No Headaches, hearing loss or vertigo. No mouth sores or sore throat. · Cardiovascular: Reviewed in HPI  · Respiratory: No cough or wheezing, no sputum production. No hematemesis. · Gastrointestinal: No abdominal pain, appetite loss, blood in stools.  No change in bowel or bladder habits. · Genitourinary: No dysuria, trouble voiding, or hematuria. · Musculoskeletal:  No gait disturbance, weakness or joint complaints. · Integumentary: No rash or pruritis. · Neurological: No headache, diplopia, change in muscle strength, numbness or tingling. No change in gait, balance, coordination, mood, affect, memory, mentation, behavior. · Psychiatric: No anxiety, no depression. · Endocrine: No malaise, fatigue or temperature intolerance. No excessive thirst, fluid intake, or urination. No tremor. · Hematologic/Lymphatic: No abnormal bruising or bleeding, blood clots or swollen lymph nodes. · Allergic/Immunologic: No nasal congestion or hives. Physical Examination:    Vitals:    11/10/21 1100   BP: 130/62   Pulse: 86   SpO2: 96%        Constitutional and General Appearance: NAD   Respiratory:  · Normal excursion and expansion without use of accessory muscles  · Resp Auscultation: no crackles or wheezing present. Normal breath sounds. Cardiovascular:  · The apical impulses not displaced  · Heart tones are crisp and normal  · Cervical veins are not engorged  · The carotid upstroke is normal in amplitude and contour without delay or bruit  ·  Regular rhythm;Normal S1S2, No S3, No murmur  · Peripheral pulses are symmetrical and full  · There is no clubbing, cyanosis of the extremities.   · No edema  · Femoral Arteries: 2+ and equal  · Pedal Pulses: 2+ and equal   Abdomen:  · No masses or tenderness  · Liver/Spleen: No Abnormalities Noted  Neurological/Psychiatric:  · Alert and oriented in all spheres  · Moves all extremities well  · Exhibits normal gait balance and coordination  · No abnormalities of mood, affect, memory, mentation, or behavior are noted      No components found for: CHLPL  Lab Results   Component Value Date    TRIG 164 (H) 12/07/2017    TRIG 196 (H) 06/12/2017    TRIG 145 12/13/2016     Lab Results   Component Value Date    HDL 50 12/07/2017    HDL 49 06/12/2017    HDL 47 12/13/2016     Lab Results   Component Value Date    LDLCALC 33 12/07/2017    LDLCALC 40 06/12/2017    LDLCALC 54 12/13/2016     Lab Results   Component Value Date    LABVLDL 33 12/07/2017    LABVLDL 39 06/12/2017    LABVLDL 29 12/13/2016     LABS Care Everywhere 12/4/19 , HDL 53, LDL 35, , ALT 37, AST 27, H/H 14.4/42.6, PLAT 166, , K 4.2, BUN/CR 10/0.88, HgbA1C 6.5     LABS Care Everywhere 12/1/20 , HDL 52, LDL 61, , ALT 38, AST 36, H/H 13.9/41.8, PLAT 174, , K 4.3, BUN/CR 13/0.93, HgbA1C 6.5       Assessment:     1. CAD (coronary artery disease)--March 2000 s/p Everett stent to prox L. CCx. Most recent GXT myoview 11/14/19  Mild fixed defect within the inferior wall and infero-apex  consistent with attenuation artifact versus prior subendocardial infarction. No ischemia. Most recent ECHO 12/5/19 EF=55%. No regional wall abnls; Grade I DD with normal filling pressure. Aortic valve sclerotic, opens adequately. Trace TR and AL. There are no concerning symptoms for angina currently. 2. Hypertension -Blood pressure 130/62, pulse 86, height 5' 4\" (1.626 m), weight 148 lb 8 oz (67.4 kg), SpO2 96 %. Well controlled and will continue current medical regimen. 3. Hyperlipidemia:  Most recent lipids Care Everywhere 12/1/20---see results above I personally reviewed. Well controlled and will continue current medical regimen. 4.   Abnormal EKG: Note EKG 11/10/2020 NSR 78bpm; PVC's/fusion complexes; RBBB; nonspecific ST change. Note RBBB was new EKG finding at 2019 visit. Not present on 2014 EKG. Plan:  1. Follow up with Oseas Bustillos MD in December as scheduled. -consult with physician for anxiety medications and annual lab work  2. Smoking cessation discussed but does not seem motivated. 3. EKG in 1 year at next OV 2022.  4. I recommend that the patient continue their currently prescribed medications. Their drug modifiable risk factors appear to be well controlled.  I will continue to address the need/dosing of medications in future visits. 5. Follow up in 1 year or sooner if needed. QUALITY MEASURES  1. Tobacco Cessation Counseling: Yes  2. Retake of BP if >140/90:   NA  3. Documentation to PCP/referring for new patient:  Sent to PCP at close of office visit  4. CAD patient on anti-platelet: Yes  5. CAD patient on STATIN therapy:  Yes  6. Patient with CHF and aFib on anticoagulation:  NA     Cost of prescription medications and patient compliance have been reviewed with patient. All questions answered. Thank you for allowing me to participate in the care of this individual.    Scribe's attestation: This note was scribed in the presence of Jagdish Stover by Dimitris Cary RN     I, Dr. Susi Coronado, personally performed the services described in this documentation, as scribed by the above signed scribe in my presence. It is both accurate and complete to my knowledge. I agree with the details independently gathered by the clinical support staff, while the remaining scribed note accurately describes my personal service to the patient. Jannette Son.  Adam Burgos M.D., [facility]

## 2021-11-10 NOTE — PATIENT INSTRUCTIONS
Plan:  1. Follow up with Kari Arzola MD in December as scheduled. -consult with physician for anxiety medications and annual lab work  2. Smoking cessation  3. EKG in 1 year  4. I recommend that the patient continue their currently prescribed medications. Their drug modifiable risk factors appear to be well controlled. I will continue to address the need/dosing of medications in future visits. 5. Follow up in 1 year or sooner if needed.

## 2023-02-14 ENCOUNTER — OFFICE VISIT (OUTPATIENT)
Dept: CARDIOLOGY CLINIC | Age: 73
End: 2023-02-14
Payer: MEDICARE

## 2023-02-14 VITALS
HEART RATE: 88 BPM | SYSTOLIC BLOOD PRESSURE: 110 MMHG | OXYGEN SATURATION: 98 % | BODY MASS INDEX: 24.75 KG/M2 | HEIGHT: 64 IN | DIASTOLIC BLOOD PRESSURE: 68 MMHG | WEIGHT: 145 LBS

## 2023-02-14 DIAGNOSIS — I25.10 CORONARY ARTERY DISEASE INVOLVING NATIVE CORONARY ARTERY OF NATIVE HEART WITHOUT ANGINA PECTORIS: Primary | ICD-10-CM

## 2023-02-14 DIAGNOSIS — Z72.0 TOBACCO ABUSE: ICD-10-CM

## 2023-02-14 PROCEDURE — 1123F ACP DISCUSS/DSCN MKR DOCD: CPT | Performed by: INTERNAL MEDICINE

## 2023-02-14 PROCEDURE — 99214 OFFICE O/P EST MOD 30 MIN: CPT | Performed by: INTERNAL MEDICINE

## 2023-02-14 PROCEDURE — 3074F SYST BP LT 130 MM HG: CPT | Performed by: INTERNAL MEDICINE

## 2023-02-14 PROCEDURE — 93000 ELECTROCARDIOGRAM COMPLETE: CPT | Performed by: INTERNAL MEDICINE

## 2023-02-14 PROCEDURE — 3078F DIAST BP <80 MM HG: CPT | Performed by: INTERNAL MEDICINE

## 2023-02-14 NOTE — PATIENT INSTRUCTIONS
Plan:  EKG shows Sinus Rhythm with RBBB no change since EKG 11/2021  The current medical regimen is effective;  continue present plan and medications. Lab work reviewed. Follow up with Dr Ruma Jarvis for regular lab work. Follow up with me in 1 year.

## 2023-02-14 NOTE — PROGRESS NOTES
[facility]   Cardiac Consultation    Referring Provider:  Veronica Chatman MD     Chief Complaint   Patient presents with    1 Year Follow Up    Coronary Artery Disease    Hypertension    Hyperlipidemia        Subjective: Mr Deanna Mathew presents for cardiology follow up of CAD, HTN, HLD; no complaints today    History of Present Illness:    Ritika Officer is a 68 y.o. ;male and former patient of Dr. Florencia Dailey. He has PMH CAD s/p CCx stent 3/00, HTN, HLD, DM, tobacco abuse, and Lyme disease dx . Note S/P LHC 3/00 by Dr. Maida Mcclain which showed 50% prox LAD stenosis, 95% prox L. CCx stenosis, 50% mid RCA which is small co-dominant vessel; S/P stent to L. CCx. Note cath done due to abnormal exercise stress ECHO test with abnormal findings and inferior ST depression. He c/o left lateral chest pain prior to testing. Exercise stress ECHO 14 baseline ECHO normal.  Most recent GXT myoview 19  Mild fixed defect within the inferior wall and infero-apex c/w attenuation artifact versus prior subendocardial infarction. No ischemia. ECHO 19 EF=55%. No WMA; Grade I DD with normal filling pressure. AV sclerosis; Trace TR and IN. Most recent EKG 23 today SR with RBBB and left axis, LAFB (no change from EKG )   Today 23, patient states he is feeling good from cardiac standpoint. Patient reported in  he was treated for Lyme's disease by Dr Anahi Broussard. He reports since last office visit his brother passed away from cirrhosis. He takes antidepressants since his brother passed. He continues to smoke 10 cigarettes a day. Patient denies current edema, chest pain, sob, palpitations, dizziness or syncope. Patient is taking all cardiac medications as prescribed and tolerates them well. His brother  of cirrhosis in . Past Medical History:   has a past medical history of CAD (coronary artery disease); Hypertension (tx since );  Diabetes mellitus (tx since ); and Hyperlipidemia. Surgical History:   has past surgical history that includes coronary angioplasty with stent (2000). Hemorrhoidectomy 1978. Social History:   reports that he has been smoking cigars. He has never used smokeless tobacco. He reports current alcohol use. He reports that he does not use drugs. He continues to work [place] his [place] in [place] without complication or limiting factors. Family History:  family history includes Cancer in his sister; Diabetes in his father and mother. Home Medications:  Prior to Admission medications    Medication Sig Start Date End Date Taking? Authorizing Provider   aspirin 81 MG tablet Take 1 tablet by mouth daily 12/5/17  Yes Vijay Sloan MD   rosuvastatin (CRESTOR) 40 MG tablet Take 1 tablet by mouth every evening 11/8/16  Yes Vijay Sloan MD   lisinopril (PRINIVIL;ZESTRIL) 20 MG tablet Take 20 mg by mouth daily. Yes Historical Provider, MD   amLODIPine (NORVASC) 10 MG tablet Take 10 mg by mouth daily. Yes Historical Provider, MD   cetirizine (ZYRTEC) 10 MG tablet Take 10 mg by mouth daily. Yes Historical Provider, MD   Multiple Vitamin (MULTIVITAMIN PO) Take  by mouth daily. Yes Historical Provider, MD        Allergies:  Patient has no known allergies. Review of Systems:   Constitutional: there has been no unanticipated weight loss. There's been no change in energy level, sleep pattern, or activity level. Eyes: No visual changes or diplopia. No scleral icterus. ENT: No Headaches, hearing loss or vertigo. No mouth sores or sore throat. Cardiovascular: Reviewed in HPI  Respiratory: No cough or wheezing, no sputum production. No hematemesis. Gastrointestinal: No abdominal pain, appetite loss, blood in stools. No change in bowel or bladder habits. Genitourinary: No dysuria, trouble voiding, or hematuria. Musculoskeletal:  No gait disturbance, weakness or joint complaints.   Integumentary: No rash or pruritis. Neurological: No headache, diplopia, change in muscle strength, numbness or tingling. No change in gait, balance, coordination, mood, affect, memory, mentation, behavior. Psychiatric: No anxiety, no depression. Endocrine: No malaise, fatigue or temperature intolerance. No excessive thirst, fluid intake, or urination. No tremor. Hematologic/Lymphatic: No abnormal bruising or bleeding, blood clots or swollen lymph nodes. Allergic/Immunologic: No nasal congestion or hives. Physical Examination:    Vitals:    02/14/23 1429   BP: 110/68   Pulse: 88   SpO2: 98%          Constitutional and General Appearance: NAD   Respiratory:  Normal excursion and expansion without use of accessory muscles  Resp Auscultation: no crackles or wheezing present. Normal breath sounds. Cardiovascular: The apical impulses not displaced  Heart tones are crisp and normal  Cervical veins are not engorged  The carotid upstroke is normal in amplitude and contour without delay or bruit   Regular rhythm;Normal S1S2, No S3, No murmur  Peripheral pulses are symmetrical and full  There is no clubbing, cyanosis of the extremities. No edema  Femoral Arteries: 2+ and equal  Pedal Pulses: 2+ and equal   Abdomen:  No masses or tenderness  Liver/Spleen: No Abnormalities Noted  Neurological/Psychiatric:  Alert and oriented in all spheres  Moves all extremities well  Exhibits normal gait balance and coordination  No abnormalities of mood, affect, memory, mentation, or behavior are noted    Labs        Assessment:     1. CAD (coronary artery disease)--March 2000 s/p [place] stent to prox L. CCx. GXT myoview 11/14/19 artifact vs scar inferior wall; no ischemia. . Most recent ECHO 12/5/19 EF=55%; no WMA; There are no concerning symptoms for angina currently. 2. Hypertension -Blood pressure 110/68, pulse 88, height 5' 4\" (1.626 m), weight 145 lb (65.8 kg), SpO2 98 %. Well controlled and will continue current medical regimen.      3. Hyperlipidemia:  Most recent lipids Care Everywhere 5/3/22---see results above I personally reviewed.  Well controlled and will continue current medical regimen.       4.   Abnormal EKG: EKG today with RBBB and LBBB (no change 11/21).  Note RBBB was new EKG finding at 2019 visit.       Plan:  EKG shows Sinus Rhythm with RBBB no change since EKG 11/2021  The current medical regimen is effective;  continue present plan and medications.   Lab work reviewed. Follow up with Dr Colón for regular lab work.   Follow up with me in 1 year.   Smoking cessation strongly encouraged but not motivated to quit.    QUALITY MEASURES  1. Tobacco Cessation Counseling: Yes  2. Retake of BP if >140/90:   NA  3. Documentation to PCP/referring for new patient:  Sent to PCP at close of office visit  4. CAD patient on anti-platelet: Yes  5. CAD patient on STATIN therapy:  Yes  6. Patient with CHF and aFib on anticoagulation:  NA       This note has been scribed in the presence of Paolo Walton MD, by Hemalatha Kaba RN.      I, Dr. Paolo Walton, personally performed the services described in this documentation, as scribed by the above signed scribe in my presence. It is both accurate and complete to my knowledge. I agree with the details independently gathered by the clinical support staff, while the remaining scribed note accurately describes my personal service to the patient.    Cost of prescription medications and patient compliance have been reviewed with patient. All questions answered.     Thank you for allowing me to participate in the care of this individual.    Paolo Walton M.D., [facility]

## 2024-03-18 NOTE — PROGRESS NOTES
current medical regimen.     3. Hyperlipidemia:  Most recent lipids Care Everywhere 11/28/23---see results above I personally reviewed. Normal LFT's. Well controlled and will continue current medical regimen.       4.   Abnormal EKG: EKG today with RBBB and LBBB (no change 11/21).  Note RBBB was new EKG finding at 2019 visit.       Plan:  EKG today showed Sinus Rhythm; LAFB; RBBB (no change since EKG 2/23)  The current medical regimen is effective; continue present plan and medications.   Lab work reviewed. Follow up with Dr Colón for annual lab work.   Smoking cessation strongly encouraged but not motivated to quit.  Follow up with me in 1 year.     QUALITY MEASURES  1. Tobacco Cessation Counseling: Yes  2. Retake of BP if >140/90:   NA  3. Documentation to PCP/referring for new patient:  Sent to PCP at close of office visit  4. CAD patient on anti-platelet: Yes  5. CAD patient on STATIN therapy:  Yes  6. Patient with CHF and aFib on anticoagulation:  NA     This note has been scribed in the presence of Paolo Walton MD, by Hemalatha Kaba RN.      I, Dr. Paolo Walton, personally performed the services described in this documentation, as scribed by the above signed scribe in my presence. It is both accurate and complete to my knowledge. I agree with the details independently gathered by the clinical support staff, while the remaining scribed note accurately describes my personal service to the patient.    Cost of prescription medications and patient compliance have been reviewed with patient. All questions answered.     Thank you for allowing me to participate in the care of this individual.    Paolo Walton M.D., [facility]

## 2024-03-19 ENCOUNTER — OFFICE VISIT (OUTPATIENT)
Dept: CARDIOLOGY CLINIC | Age: 74
End: 2024-03-19
Payer: MEDICARE

## 2024-03-19 VITALS
OXYGEN SATURATION: 96 % | DIASTOLIC BLOOD PRESSURE: 62 MMHG | HEART RATE: 87 BPM | WEIGHT: 146 LBS | BODY MASS INDEX: 24.92 KG/M2 | SYSTOLIC BLOOD PRESSURE: 118 MMHG | HEIGHT: 64 IN

## 2024-03-19 DIAGNOSIS — Z72.0 TOBACCO ABUSE: ICD-10-CM

## 2024-03-19 DIAGNOSIS — I25.10 CORONARY ARTERY DISEASE INVOLVING NATIVE CORONARY ARTERY OF NATIVE HEART WITHOUT ANGINA PECTORIS: Primary | ICD-10-CM

## 2024-03-19 PROCEDURE — 99214 OFFICE O/P EST MOD 30 MIN: CPT | Performed by: INTERNAL MEDICINE

## 2024-03-19 PROCEDURE — 3078F DIAST BP <80 MM HG: CPT | Performed by: INTERNAL MEDICINE

## 2024-03-19 PROCEDURE — 93000 ELECTROCARDIOGRAM COMPLETE: CPT | Performed by: INTERNAL MEDICINE

## 2024-03-19 PROCEDURE — 1123F ACP DISCUSS/DSCN MKR DOCD: CPT | Performed by: INTERNAL MEDICINE

## 2024-03-19 PROCEDURE — 3074F SYST BP LT 130 MM HG: CPT | Performed by: INTERNAL MEDICINE

## 2024-03-19 NOTE — PATIENT INSTRUCTIONS
Plan:  EKG today showed Sinus Rhythm with RBBB no change since EKG 2/23  The current medical regimen is effective; continue present plan and medications.   Lab work reviewed. Follow up with Dr Colón for annual lab work.   Smoking cessation strongly encouraged but not motivated to quit.  Follow up with me in 1 year.

## 2025-03-18 NOTE — PROGRESS NOTES
[facility]   Cardiac Follow up     Referring Provider:  Luke Colón MD     Chief Complaint   Patient presents with    1 Year Follow Up    Coronary Artery Disease    Hypertension    Hyperlipidemia     Former patient of Dr. Tim Garner.  Subjective: Mr Caldwell presents for cardiology follow up of CAD, HTN, HLD; no complaints today    History of Present Illness:    Vishnu Caldwell is a 75 y.o. male who has PMH CAD s/p CCx stent 3/00, HTN, HLD, DM, tobacco abuse, and Lyme disease dx . Note S/P LHC 3/00 by Dr. Coto showed 50% prox LAD stenosis, 95% prox L. CCx stenosis, 50% mid RCA small co-dominant vessel; S/P stent to L. CCx. Note cath done due to abnormal exercise stress ECHO. He c/o left lateral CP prior to testing. Prior testing: Exercise stress ECHO 14 normal. GXT myoview 19  Mild fixed defect within the inferior wall and infero-apex c/w attenuation artifact versus prior subendocardial infarction. No ischemia. ECHO 19 EF=55%.No WMA; Grade I DD with normal filling pressure. AV sclerosis; Trace TR and MN. Abdominal US  negative AAA.   OV 23. He reported in  being treated for Lyme's disease by Dr Colón.    EKG today- SR with RBBB and left axis, LAFB, PVC's (PVC new compared to 3/24).   Today, he reports feeling good. He states his depression is better that started after his brother . He takes an OTC called [employer] meant to help with anxiety. States he feels good walking and with daily activity. He is enjoying nursing home. 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 Patient currently denies any weight gain, edema, palpitations, chest pain, shortness of breath, dizziness, and syncope. Continues to smoke 1/2 pack cigarettes daily. His brother  of cirrhosis in  and prior depression afterwards improved.      Weight today is 149#- up 3 lbs from last visit     Past Medical History:   has a past medical history of CAD (coronary artery disease); Hypertension (tx

## 2025-03-21 ENCOUNTER — OFFICE VISIT (OUTPATIENT)
Dept: CARDIOLOGY CLINIC | Age: 75
End: 2025-03-21

## 2025-03-21 VITALS
WEIGHT: 149 LBS | DIASTOLIC BLOOD PRESSURE: 64 MMHG | BODY MASS INDEX: 25.44 KG/M2 | HEIGHT: 64 IN | OXYGEN SATURATION: 99 % | HEART RATE: 96 BPM | SYSTOLIC BLOOD PRESSURE: 126 MMHG

## 2025-03-21 DIAGNOSIS — Z00.00 ANNUAL PHYSICAL EXAM: Primary | ICD-10-CM

## 2025-03-21 DIAGNOSIS — I25.10 CORONARY ARTERY DISEASE INVOLVING NATIVE CORONARY ARTERY OF NATIVE HEART WITHOUT ANGINA PECTORIS: ICD-10-CM

## 2025-03-21 DIAGNOSIS — Z72.0 TOBACCO ABUSE: ICD-10-CM

## 2025-03-21 DIAGNOSIS — I10 HYPERTENSION, UNSPECIFIED TYPE: ICD-10-CM

## 2025-03-21 DIAGNOSIS — E78.49 OTHER HYPERLIPIDEMIA: ICD-10-CM

## 2025-03-21 RX ORDER — M-VIT,TX,IRON,MINS/CALC/FOLIC 27MG-0.4MG
1 TABLET ORAL DAILY
COMMUNITY

## 2025-03-21 NOTE — PATIENT INSTRUCTIONS
Plan:   Continue current medications- Aspirin 81 mg daily. PCP refills his medications    Ok to take [employer] supplement    We dicussed smoking cessation. Call 1800-quit now for support    He had a abdominal US in 2017 that was normal.   5.    Follow up in 1 year

## 2025-04-20 PROBLEM — Z00.00 ANNUAL PHYSICAL EXAM: Status: RESOLVED | Noted: 2025-03-21 | Resolved: 2025-04-20

## 2025-08-22 ENCOUNTER — TELEPHONE (OUTPATIENT)
Dept: INTERVENTIONAL RADIOLOGY/VASCULAR | Age: 75
End: 2025-08-22